# Patient Record
Sex: FEMALE
[De-identification: names, ages, dates, MRNs, and addresses within clinical notes are randomized per-mention and may not be internally consistent; named-entity substitution may affect disease eponyms.]

---

## 2017-01-18 PROCEDURE — 88305 TISSUE EXAM BY PATHOLOGIST: CPT | Performed by: OBSTETRICS & GYNECOLOGY

## 2017-08-07 ENCOUNTER — CHARTING TRANS (OUTPATIENT)
Dept: OTHER | Age: 62
End: 2017-08-07

## 2017-08-12 PROCEDURE — 81003 URINALYSIS AUTO W/O SCOPE: CPT | Performed by: FAMILY MEDICINE

## 2018-03-01 PROCEDURE — 87624 HPV HI-RISK TYP POOLED RSLT: CPT | Performed by: OBSTETRICS & GYNECOLOGY

## 2018-03-01 PROCEDURE — 88175 CYTOPATH C/V AUTO FLUID REDO: CPT | Performed by: OBSTETRICS & GYNECOLOGY

## 2018-07-03 PROBLEM — S76.312A LEFT HAMSTRING MUSCLE STRAIN: Status: ACTIVE | Noted: 2018-07-03

## 2018-11-17 PROCEDURE — 87389 HIV-1 AG W/HIV-1&-2 AB AG IA: CPT | Performed by: OBSTETRICS & GYNECOLOGY

## 2018-11-17 PROCEDURE — 86780 TREPONEMA PALLIDUM: CPT | Performed by: OBSTETRICS & GYNECOLOGY

## 2018-11-17 PROCEDURE — 81003 URINALYSIS AUTO W/O SCOPE: CPT | Performed by: FAMILY MEDICINE

## 2018-11-17 PROCEDURE — 86803 HEPATITIS C AB TEST: CPT | Performed by: OBSTETRICS & GYNECOLOGY

## 2019-02-19 PROCEDURE — 88305 TISSUE EXAM BY PATHOLOGIST: CPT | Performed by: RADIOLOGY

## 2019-12-23 PROBLEM — S76.312A LEFT HAMSTRING MUSCLE STRAIN: Status: RESOLVED | Noted: 2018-07-03 | Resolved: 2019-12-23

## 2024-01-08 ENCOUNTER — HOSPITAL ENCOUNTER (OUTPATIENT)
Age: 69
Discharge: HOME | End: 2024-01-08
Payer: MEDICARE

## 2024-01-08 VITALS
SYSTOLIC BLOOD PRESSURE: 128 MMHG | OXYGEN SATURATION: 96 % | BODY MASS INDEX: 35.44 KG/M2 | DIASTOLIC BLOOD PRESSURE: 89 MMHG | RESPIRATION RATE: 18 BRPM | HEART RATE: 67 BPM | WEIGHT: 200 LBS | HEIGHT: 63 IN | TEMPERATURE: 98 F

## 2024-01-08 DIAGNOSIS — H61.23 BILATERAL IMPACTED CERUMEN: Primary | ICD-10-CM

## 2024-01-08 PROCEDURE — 99203 OFFICE O/P NEW LOW 30 MIN: CPT | Performed by: NURSE PRACTITIONER

## 2024-01-08 NOTE — ED PROVIDER NOTES
Patient Seen in: Immediate Care [facility]    History     Chief Complaint   Patient presents with    Ear Pain     Stated Complaint: ear pain    HPI    Patient here today with  with c/o feeling like the right  ear is clogged, Pt has no  fever, no congestion or  cough.  Has a history of cerumen impaction.  She reports that she is going out of town on Saturday and wants to make sure that she does not have any issues.  No difficulty breathing. There are not alleviating or mitigating factors.  Patient is tolerating p.o. fluids without difficulty.      Past Medical History:   Diagnosis Date    Abnormal Pap smear of cervix 20 yrs ago    Allergic rhinitis     ASCUS of cervix with negative high risk HPV 2014,2016    SRI I (cervical intraepithelial neoplasia I) 2017    Coronary atherosclerosis     Esophageal reflux     Glaucoma     Hyperlipidemia     diet controlled    HYPERTENSION        Past Surgical History:   Procedure Laterality Date    BREAST BIOPSY Left     COLONOSCOPY  '    repeat     COLPOSCOPY,BX CERVIX/ENDOCERV CURR  15-20 yrs ago    COLPOSCOPY,BX CERVIX/ENDOCERV CURR  2017    SRI 1    EYE MUSCLE SURG PROC UNLISTED      lazy eye    KNEE REPLACEMENT SURGERY Left     2015    KNEE REPLACEMENT SURGERY      Right cemented TKR @[facility] 6/3/16.     NEEDLE BIOPSY LEFT  2019            Family History   Problem Relation Age of Onset    Kidney Disease Father     Gastro-Intestinal Disorder Mother     Hypertension Mother     Lipids Mother     Heart Disease Mother        Social History     Socioeconomic History    Marital status:    Tobacco Use    Smoking status: Light Smoker     Packs/day: 0.50     Years: 20.00     Additional pack years: 0.00     Total pack years: 10.00     Types: Cigarettes     Last attempt to quit: 1990     Years since quittin.0    Smokeless tobacco: Never   Vaping Use    Vaping Use: Never used   Substance and Sexual Activity    Alcohol use: Yes     Comment: 1-2  drinks daily    Drug use: No    Sexual activity: Yes     Partners: Male       Review of Systems    Positive for stated complaint: ear pain  Other systems are as noted in HPI.  Constitutional and vital signs reviewed.      All other systems reviewed and negative except as noted above.    PSFH elements reviewed from today and agreed except as otherwise stated in HPI.    Physical Exam     ED Triage Vitals [01/08/24 1134]   /89   Pulse 67   Resp 18   Temp 97.9 °F (36.6 °C)   Temp src Temporal   SpO2 96 %   O2 Device None (Room air)       Current:/89   Pulse 67   Temp 97.9 °F (36.6 °C) (Temporal)   Resp 18   Ht 160 cm (5' 3\")   Wt 90.7 kg   LMP 01/01/2007 (Exact Date)   SpO2 96%   BMI 35.43 kg/m²       GENERAL: well developed, well nourished, well hydrated, no distress  EARS: Right TM is nonvisualized due to a cerumen impaction.  Left TM is partially visualized due to cerumen.  NOSE: nasal turbinates boggy  THROAT: There is no posterior oropharynx injection, edema, tonsillar asymmetry.  No uvular edema or deviation.  Patient is tolerating their own secretions.  LUNGS: no resp distress, increased upper airway sounds, clear at the bases  SKIN: good skin turgor, no obvious rashes  NECK: supple, no adenopathy, no thyromegaly  CARDIO: RRR without murmur  EXTREMITIES: no cyanosis, clubbing or edema  GI: soft, non-tender, normal bowel sounds  HEAD: normocephalic, atraumatic  EYES: sclera non icteric bilateral, conjunctiva clear      ED Course   Labs Reviewed - No data to display      I have personally  reviewed available prior medical records for any recent pertinent discharge summaries/testing. Patient/family updated on results and plan, a verbalized understanding and agreement with the plan.  I explained to the patient that emergent conditions may arise and to go to the ER for new, worsening or any persistent conditions. I've explained the importance of taking all medicatons as prescribed, follow up, and  return precuations,  All questions answered.    MDM     Patient presents with earache. History and physical exam as above.  No fever, nontoxic appearing.  Does not meet SIRS criteria.  Oxygen saturation within normal limits for this patient.  No sore throat or difficulty swallowing, no trismus, unilateral tonsillar deviation or uvula swelling.  Presentation consistent with cerumen impaction.  There does not appear to be an otitis media or otitis externa discussed option of expectant management..  Recommend follow-up with PCP.  Counseled on supportive care including Tylenol.  Return to ED precautions discussed with the family.         Disposition and Plan     Clinical Impression:  1. Bilateral impacted cerumen        Disposition:  Discharge    Follow-up:  Vincent Almeida DO  [address] [place] [address] [phone]            Medications Prescribed:  Current Discharge Medication List

## 2024-01-08 NOTE — DISCHARGE INSTRUCTIONS
You had your ears irrigated today.  At home you can use Debrox to help with earwax removal    Refrain from using Q-tips, this can push earwax back into the ears.    Follow-up with your primary care physician and return to the emergency department with any worsening symptoms or concerns

## 2024-01-10 ENCOUNTER — HOSPITAL ENCOUNTER (OUTPATIENT)
Age: 69
Discharge: HOME | End: 2024-01-10
Payer: MEDICARE

## 2024-01-10 VITALS
RESPIRATION RATE: 16 BRPM | OXYGEN SATURATION: 97 % | SYSTOLIC BLOOD PRESSURE: 132 MMHG | WEIGHT: 200 LBS | BODY MASS INDEX: 35.44 KG/M2 | HEIGHT: 63 IN | DIASTOLIC BLOOD PRESSURE: 86 MMHG | HEART RATE: 62 BPM | TEMPERATURE: 97 F

## 2024-01-10 DIAGNOSIS — H69.91 DYSFUNCTION OF RIGHT EUSTACHIAN TUBE: Primary | ICD-10-CM

## 2024-01-10 PROCEDURE — 99213 OFFICE O/P EST LOW 20 MIN: CPT | Performed by: NURSE PRACTITIONER

## 2024-01-10 RX ORDER — METHYLPREDNISOLONE 4 MG/1
TABLET ORAL
Qty: 1 EACH | Refills: 0 | Status: SHIPPED | OUTPATIENT
Start: 2024-01-10

## 2024-01-10 NOTE — ED PROVIDER NOTES
Patient Seen in: Immediate Care [facility]      History     Chief Complaint   Patient presents with    Ear Problem     I was there 2 days ago.  My ear still feels clogged. - Entered by patient     Stated Complaint: Ear Problem - I was there 2 days ago.  My ear still feels clogged.    Subjective:   68-year-old female presents to complaints of ongoing decreased hearing to the right ear.  Was seen here 2 days prior had ears irrigated and earwax removed.  Denies any pain.  No fever or chills.  No dizziness no nausea vomiting.  Alert oriented x 3.  No other symptoms or concerns.  The patient's medication list, past medical history and social history elements as listed in today's nurse's notes were reviewed and agreed (except as otherwise stated in the HPI).  The patient's family history reviewed and determined to be noncontributory to the presenting problem            Objective:   Past Medical History:   Diagnosis Date    Abnormal Pap smear of cervix 20 yrs ago    Allergic rhinitis     ASCUS of cervix with negative high risk HPV 8/2014,12/2016    SRI I (cervical intraepithelial neoplasia I) 1/2017    Coronary atherosclerosis     Esophageal reflux     Glaucoma     Hyperlipidemia     diet controlled    HYPERTENSION               Past Surgical History:   Procedure Laterality Date    BREAST BIOPSY Left 2019    COLONOSCOPY  '13    repeat '20    COLPOSCOPY,BX CERVIX/ENDOCERV CURR  15-20 yrs ago    COLPOSCOPY,BX CERVIX/ENDOCERV CURR  1/2017    SRI 1    EYE MUSCLE SURG PROC UNLISTED  1979    lazy eye    KNEE REPLACEMENT SURGERY Left     7/2015    KNEE REPLACEMENT SURGERY      Right cemented TKR @[facility] 6/3/16.     NEEDLE BIOPSY LEFT  02/2019                Social History     Socioeconomic History    Marital status:    Tobacco Use    Smoking status: Light Smoker     Packs/day: 0.50     Years: 20.00     Additional pack years: 0.00     Total pack years: 10.00     Types: Cigarettes     Last attempt to quit: 1/1/1990      Years since quittin.0    Smokeless tobacco: Never   Vaping Use    Vaping Use: Never used   Substance and Sexual Activity    Alcohol use: Yes     Comment: 1-2 drinks daily    Drug use: No    Sexual activity: Yes     Partners: Male              Review of Systems    Positive for stated complaint: Ear Problem - I was there 2 days ago.  My ear still feels clogged.  Other systems are as noted in HPI.  Constitutional and vital signs reviewed.      All other systems reviewed and negative except as noted above.    Physical Exam     ED Triage Vitals [01/10/24 1215]   /86   Pulse 62   Resp 16   Temp 97.4 °F (36.3 °C)   Temp src Temporal   SpO2 97 %   O2 Device None (Room air)       Current:/86   Pulse 62   Temp 97.4 °F (36.3 °C) (Temporal)   Resp 16   Ht 160 cm (5' 3\")   Wt 90.7 kg   LMP 2007 (Exact Date)   SpO2 97%   BMI 35.43 kg/m²         Physical Exam  Vitals and nursing note reviewed.   Constitutional:       Appearance: She is well-developed.   HENT:      Head: Normocephalic.      Right Ear: Ear canal normal. A middle ear effusion is present.      Left Ear: Tympanic membrane and ear canal normal.      Nose: Mucosal edema present.      Mouth/Throat:      Pharynx: Uvula midline. Posterior oropharyngeal erythema present.   Eyes:      Conjunctiva/sclera: Conjunctivae normal.      Pupils: Pupils are equal, round, and reactive to light.   Cardiovascular:      Rate and Rhythm: Normal rate and regular rhythm.   Pulmonary:      Effort: Pulmonary effort is normal.      Breath sounds: Normal breath sounds.   Musculoskeletal:      Cervical back: Normal range of motion and neck supple.   Lymphadenopathy:      Cervical: No cervical adenopathy.   Skin:     General: Skin is warm and dry.   Neurological:      Mental Status: She is alert and oriented to person, place, and time.               ED Course   Labs Reviewed - No data to display                   MDM     Please note that this report has been produced  using speech recognition software and may contain errors related to that system including, but not limited to, errors in grammar, punctuation, and spelling, as well as words and phrases that possibly may have been recognized inappropriately.  If there are any questions or concerns, contact the dictating provider for clarification.        Note to patient: The 21st Century Cures Act makes medical notes like these available to patients in the interest of transparency. However, this is a medical document intended as peer to peer communication. It is written in medical language and may contain abbreviations or verbiage that are unfamiliar. It may appear blunt or direct. Medical documents are intended to carry relevant information, facts as evident, and the clinical opinion of the practitioner.                                   Medical Decision Making  Differential diagnosis includes but is not limited to: Otitis media, otitis externa, strep throat, eustachian tube dysfunction, mastoiditis, TMJ      Presented today with ongoing decreased hearing to the right ear.  Had earwax removed 2 days prior with some relief.  Still continues to have decreased hearing to the right ear.  On exam possible fluid behind the right TM but no signs of infection.  Will give prescription for Medrol Dosepak to take as directed.  Encouraged to continue take over-the-counter antihistamine.  To follow-up with primary care physician or ENT in 1 week if symptoms do not improve.  Patient verbalized understanding and agreed to plan of care.    Risk  OTC drugs.  Prescription drug management.  Risk Details: Medrol Dosepak        Disposition and Plan     Clinical Impression:  1. Dysfunction of right eustachian tube         Disposition:  Discharge  1/10/2024 12:33 pm    Follow-up:  Vincent Almeida DO  [address] [place] [address]  [phone]    In 1 week  As needed          Medications Prescribed:  Current Discharge Medication List        START  taking these medications    Details   methylPREDNISolone (MEDROL) 4 MG Oral Tablet Therapy Pack Dosepack: take as directed  Qty: 1 each, Refills: 0

## 2024-02-04 ENCOUNTER — HOSPITAL ENCOUNTER (OUTPATIENT)
Age: 69
Discharge: HOME | End: 2024-02-04
Payer: MEDICARE

## 2024-02-04 ENCOUNTER — APPOINTMENT (OUTPATIENT)
Dept: ULTRASOUND IMAGING | Facility: HOSPITAL | Age: 69
End: 2024-02-04
Attending: NURSE PRACTITIONER
Payer: MEDICARE

## 2024-02-04 VITALS
OXYGEN SATURATION: 95 % | DIASTOLIC BLOOD PRESSURE: 93 MMHG | TEMPERATURE: 98 F | RESPIRATION RATE: 22 BRPM | HEART RATE: 82 BPM | SYSTOLIC BLOOD PRESSURE: 148 MMHG

## 2024-02-04 DIAGNOSIS — M79.605 LEFT LEG PAIN: Primary | ICD-10-CM

## 2024-02-04 PROCEDURE — 93971 EXTREMITY STUDY: CPT | Performed by: NURSE PRACTITIONER

## 2024-02-04 PROCEDURE — 99213 OFFICE O/P EST LOW 20 MIN: CPT | Performed by: NURSE PRACTITIONER

## 2024-02-04 NOTE — DISCHARGE INSTRUCTIONS
Your ultrasound did not show any evidence of a blood clot in your leg.  This is likely a muscle strain in your leg or possibly an inflammation of the tendon or ligament.  Rest left leg  Cold compacts to injured area 3-4 times per day for the next few days.  Cool compress in place approximately 10 to 15 minutes at a time.  Always place a cloth barrier between the cold pack and your skin.  Elevate extremity often-above the level of the heart is best   You may take your own Mobic as previously prescribed if needed.  You may take over-the-counter acetaminophen as directed on packaging if needed.  Follow-up with your primary care provider.  An orthopedic referral is also provided if needed.  Seek immediate medical attention for worsening symptoms.

## 2024-02-04 NOTE — ED INITIAL ASSESSMENT (HPI)
Pt has had pain to her left lateral shin area that has not improved  it has been there for 10 days, and she was able to ambulate in [place] but has traveled by plane 4 flights in the past 2 weeks.  No sob

## 2024-02-04 NOTE — ED PROVIDER NOTES
Patient Seen in: Immediate Care [facility]      History     Chief Complaint   Patient presents with    Leg or Foot Injury     Entered by patient     Stated Complaint: Leg or Foot Injury    Subjective:   HPI  Patient is a 68-year-old female with hypertension, hyperlipidemia, glaucoma, esophageal reflux, allergic rhinitis, coronary artery disease presents with left lower extremity pain and swelling over the past 9 days.  Frequent air flights over the past few weeks.  Most recently returned from [place] this past Friday.  She walked more than usual while in [facility].  She also flew to [place].  These air flights did not exceed 4 hours.  She has taken Mobic without alleviation of her leg pain.  She points to left lateral lower extremity when describing pain.  Pain is exacerbated with ambulation.  Denies paresthesias distally.  Denies shortness of breath.  Denies recent travel, immobilization, surgery, bone fractures, personal/ family hx of DVTs/ PEs, blood clotting disorders, use of estrogen hormonal medications .  Denies hemoptysis/ tobacco use.        Objective:   Past Medical History:   Diagnosis Date    Abnormal Pap smear of cervix 20 yrs ago    Allergic rhinitis     ASCUS of cervix with negative high risk HPV 8/2014,12/2016    SRI I (cervical intraepithelial neoplasia I) 1/2017    Coronary atherosclerosis     Esophageal reflux     Glaucoma     Hyperlipidemia     diet controlled    HYPERTENSION               Past Surgical History:   Procedure Laterality Date    BREAST BIOPSY Left 2019    COLONOSCOPY  '13    repeat '20    COLPOSCOPY,BX CERVIX/ENDOCERV CURR  15-20 yrs ago    COLPOSCOPY,BX CERVIX/ENDOCERV CURR  1/2017    SRI 1    EYE MUSCLE SURG PROC UNLISTED  1979    lazy eye    KNEE REPLACEMENT SURGERY Left     7/2015    KNEE REPLACEMENT SURGERY      Right cemented TKR @[facility] 6/3/16.     NEEDLE BIOPSY LEFT  02/2019                Social History     Socioeconomic History    Marital status:    Tobacco Use     Smoking status: Light Smoker     Packs/day: 0.50     Years: 20.00     Additional pack years: 0.00     Total pack years: 10.00     Types: Cigarettes     Last attempt to quit: 1990     Years since quittin.1    Smokeless tobacco: Never   Vaping Use    Vaping Use: Never used   Substance and Sexual Activity    Alcohol use: Yes     Comment: 1-2 drinks daily    Drug use: No    Sexual activity: Yes     Partners: Male              Review of Systems    Positive for stated complaint: Leg or Foot Injury  Other systems are as noted in HPI.  Constitutional and vital signs reviewed.      All other systems reviewed and negative except as noted above.    Physical Exam     ED Triage Vitals [24 1122]   BP (!) 148/93   Pulse 82   Resp 22   Temp 98 °F (36.7 °C)   Temp src Temporal   SpO2 95 %   O2 Device None (Room air)       Current:BP (!) 148/93   Pulse 82   Temp 98 °F (36.7 °C) (Temporal)   Resp 22   LMP 2007 (Exact Date)   SpO2 95%         Physical Exam  Vitals and nursing note reviewed.   Constitutional:       General: She is not in acute distress.     Appearance: Normal appearance. She is not ill-appearing, toxic-appearing or diaphoretic.   HENT:      Mouth/Throat:      Mouth: Mucous membranes are moist.      Pharynx: Oropharynx is clear. No oropharyngeal exudate or posterior oropharyngeal erythema.   Eyes:      Conjunctiva/sclera: Conjunctivae normal.   Cardiovascular:      Rate and Rhythm: Normal rate and regular rhythm.      Pulses: Normal pulses.      Heart sounds: Normal heart sounds.   Pulmonary:      Effort: Pulmonary effort is normal.      Breath sounds: Normal breath sounds.   Abdominal:      General: Bowel sounds are normal. There is no distension.      Palpations: Abdomen is soft.      Tenderness: There is no abdominal tenderness.   Musculoskeletal:         General: Swelling present.      Comments: Left lower extremity with mild left calf swelling without tenderness to palpation of the calf.   Left lateral lower leg with mild tenderness over mid to proximal fibula.  CMS intact distally.   Lymphadenopathy:      Cervical: No cervical adenopathy.   Skin:     General: Skin is warm and dry.      Capillary Refill: Capillary refill takes less than 2 seconds.   Neurological:      Mental Status: She is alert and oriented to person, place, and time.   Psychiatric:         Mood and Affect: Mood normal.         Behavior: Behavior normal.                 ED Course   Labs Reviewed - No data to display            US VENOUS DOPPLER LEG LEFT - DIAG IMG (CPT=93971)    Result Date: 2/4/2024  PROCEDURE:  US VENOUS DOPPLER LEG LEFT - DIAG IMG (CPT=93971)  COMPARISON:  None.  INDICATIONS:  Leg or Foot Injury, pain  TECHNIQUE:  Real time, grey scale, and duplex ultrasound was used to evaluate the lower extremity venous system. B-mode two-dimensional images of the vascular structures, Doppler spectral analysis, and color flow.  Doppler imaging were performed.  The following veins were imaged:  Common, deep, and superficial femoral, popliteal, sapheno-femoral junction, posterior tibial veins, and the contralateral common femoral vein.  PATIENT STATED HISTORY: (As transcribed by Technologist)     FINDINGS:  EXTREMITY EXAMINED:  Left lower SAPHENOFEMORAL JUNCTION:  No reflux. THROMBI:  None visible. COMPRESSION:  Normal compressibility, phasicity, and augmentation. OTHER:  Negative.            CONCLUSION:  No evidence of left lower extremity DVT.   LOCATION:  [place]    Dictated by (CST): Ollie Soliz MD on 2/04/2024 at 1:24 PM     Finalized by (CST): Ollie Soliz MD on 2/04/2024 at 1:27 PM               MDM     68-year-old female presents with 9-day history of left lower extremity pain and swelling.  Frequent air flights recently.  Denies history of blood clots or blood clotting disorders.  Differential; DVT, musculoskeletal etiology.  Venous Doppler performed in the outpatient department of [facility].  There is no evidence of a  left lower extremity DVT.  Patient was contacted.  Likely this is musculoskeletal in nature.  Conservative treatment.  Follow-up with PCP or seek immediate medical attention if symptoms worsen.  Patient is agreeable with this plan.                                     Medical Decision Making      Disposition and Plan     Clinical Impression:  1. Left leg pain         Disposition:  Discharge  2/4/2024  1:46 pm    Follow-up:  Vincent Almeida DO  [address]   [facility] [address] [phone]    Schedule an appointment as soon as possible for a visit       Flex Werner PA  [address] [place] [address] [phone]      Orthopedic referral if needed          Medications Prescribed:  Current Discharge Medication List

## 2024-07-16 RX ORDER — METHOCARBAMOL 750 MG/1
750 TABLET, FILM COATED ORAL DAILY
COMMUNITY

## 2024-07-16 RX ORDER — FLUTICASONE PROPIONATE 50 MCG
2 SPRAY, SUSPENSION (ML) NASAL DAILY
COMMUNITY
Start: 2024-01-23

## 2024-07-19 ENCOUNTER — ANESTHESIA (OUTPATIENT)
Dept: ENDOSCOPY | Facility: HOSPITAL | Age: 69
End: 2024-07-19
Payer: MEDICARE

## 2024-07-19 ENCOUNTER — HOSPITAL ENCOUNTER (OUTPATIENT)
Facility: HOSPITAL | Age: 69
Setting detail: HOSPITAL OUTPATIENT SURGERY
Discharge: HOME | End: 2024-07-19
Attending: INTERNAL MEDICINE | Admitting: INTERNAL MEDICINE
Payer: MEDICARE

## 2024-07-19 ENCOUNTER — ANESTHESIA EVENT (OUTPATIENT)
Dept: ENDOSCOPY | Facility: HOSPITAL | Age: 69
End: 2024-07-19
Payer: MEDICARE

## 2024-07-19 VITALS
SYSTOLIC BLOOD PRESSURE: 103 MMHG | OXYGEN SATURATION: 93 % | TEMPERATURE: 98 F | RESPIRATION RATE: 17 BRPM | DIASTOLIC BLOOD PRESSURE: 64 MMHG | BODY MASS INDEX: 35.44 KG/M2 | HEART RATE: 69 BPM | WEIGHT: 200 LBS | HEIGHT: 63 IN

## 2024-07-19 PROCEDURE — 0DBP8ZX EXCISION OF RECTUM, VIA NATURAL OR ARTIFICIAL OPENING ENDOSCOPIC, DIAGNOSTIC: ICD-10-PCS | Performed by: INTERNAL MEDICINE

## 2024-07-19 PROCEDURE — 88305 TISSUE EXAM BY PATHOLOGIST: CPT | Performed by: INTERNAL MEDICINE

## 2024-07-19 RX ORDER — NALOXONE HYDROCHLORIDE 0.4 MG/ML
0.08 INJECTION, SOLUTION INTRAMUSCULAR; INTRAVENOUS; SUBCUTANEOUS ONCE AS NEEDED
Status: DISCONTINUED | OUTPATIENT
Start: 2024-07-19 | End: 2024-07-19

## 2024-07-19 RX ORDER — SODIUM CHLORIDE, SODIUM LACTATE, POTASSIUM CHLORIDE, CALCIUM CHLORIDE 600; 310; 30; 20 MG/100ML; MG/100ML; MG/100ML; MG/100ML
INJECTION, SOLUTION INTRAVENOUS CONTINUOUS
Status: DISCONTINUED | OUTPATIENT
Start: 2024-07-19 | End: 2024-07-19

## 2024-07-19 RX ORDER — LIDOCAINE HYDROCHLORIDE 10 MG/ML
INJECTION, SOLUTION EPIDURAL; INFILTRATION; INTRACAUDAL; PERINEURAL AS NEEDED
Status: DISCONTINUED | OUTPATIENT
Start: 2024-07-19 | End: 2024-07-19 | Stop reason: SURG

## 2024-07-19 RX ORDER — ONDANSETRON 2 MG/ML
4 INJECTION INTRAMUSCULAR; INTRAVENOUS ONCE AS NEEDED
Status: DISCONTINUED | OUTPATIENT
Start: 2024-07-19 | End: 2024-07-19

## 2024-07-19 RX ADMIN — SODIUM CHLORIDE, SODIUM LACTATE, POTASSIUM CHLORIDE, CALCIUM CHLORIDE: 600; 310; 30; 20 INJECTION, SOLUTION INTRAVENOUS at 11:01:00

## 2024-07-19 RX ADMIN — LIDOCAINE HYDROCHLORIDE 25 MG: 10 INJECTION, SOLUTION EPIDURAL; INFILTRATION; INTRACAUDAL; PERINEURAL at 11:01:00

## 2024-07-19 NOTE — ANESTHESIA POSTPROCEDURE EVALUATION
[facility]    Adina Gandhi Patient Status:  Hospital Outpatient Surgery   Age/Gender 68 year old female MRN [id number]   Location [facility] ENDOSCOPY PAIN [facility]ENTER Attending No att. providers found   Hosp Day # 0 PCP Vincent Almeida DO       Anesthesia Post-op Note    COLONOSCOPY with cold snare polypectomy    Procedure Summary       Date: 07/19/24 Room / Location:  ENDOSCOPY 03 / [facility] ENDOSCOPY    Anesthesia Start: 1100 Anesthesia Stop: 1121    Procedure: COLONOSCOPY with cold snare polypectomy Diagnosis: (hemorrhoids, diverticulosis, colon polyps)    Surgeons: Herve Patterson MD Anesthesiologist: Constantino Spring MD    Anesthesia Type: MAC ASA Status: 3            Anesthesia Type: MAC    Vitals Value Taken Time   /68 07/19/24 1136   Temp 98 07/19/24 1214   Pulse 69 07/19/24 1136   Resp 18 07/19/24 1214   SpO2 98 % 07/19/24 1136   Vitals shown include unfiled device data.    Patient Location: Endoscopy    Anesthesia Type: MAC    Airway Patency: patent    Postop Pain Control: adequate    Mental Status: preanesthetic baseline    Nausea/Vomiting: none    Cardiopulmonary/Hydration status: stable euvolemic    Complications: no apparent anesthesia related complications    Postop vital signs: stable    Dental Exam: Unchanged from Preop    Patient to be discharged home when criteria met.

## 2024-07-19 NOTE — H&P
History and Physical for Endoscopic Procedure      Adina Gandhi Patient Status:  Hospital Outpatient Surgery    10/18/1955 MRN [id number]   Location [facility] ENDOSCOPY PAIN CENTER Attending Herve Patterson MD   Hosp Day # 0 PCP Vincent Almeida DO     Date of Consult:  24    Reason for Consultation:  Colon cancer screening    History of Present Illness:  Adina Gandhi is a a(n) 68 year old female.     History:  Past Medical History:    Abnormal Pap smear of cervix    Allergic rhinitis    ASCUS of cervix with negative high risk HPV    Back problem    SRI I (cervical intraepithelial neoplasia I)    Coronary atherosclerosis    Esophageal reflux    Glaucoma    High blood pressure    High cholesterol    Hx of motion sickness    Hyperlipidemia    diet controlled    HYPERTENSION    Visual impairment    reading glasses     Past Surgical History:   Procedure Laterality Date    Breast biopsy Left     Colonoscopy  '    repeat     Colposcopy,bx cervix/endocerv curr  15-20 yrs ago    Colposcopy,bx cervix/endocerv curr  2017    SRI 1    Eye muscle surg proc unlisted      lazy eye    Knee replacement surgery Left     2015    Knee replacement surgery      Right cemented TKR @[facility] 6/3/16.     Needle biopsy left  2019     Family History   Problem Relation Age of Onset    Kidney Disease Father     Gastro-Intestinal Disorder Mother     Hypertension Mother     Lipids Mother     Heart Disease Mother       reports that she has been smoking cigarettes. She started smoking about 54 years ago. She has a 10 pack-year smoking history. She has never used smokeless tobacco. She reports current alcohol use. She reports current drug use. Drug: Cannabis.    Allergies:  Allergies   Allergen Reactions    Dander      Cats-eye tearing, swelling of eyes    Dust      Nasal symptoms    Mold      Nasal symptoms       Medications:    Current Facility-Administered Medications:     lactated ringers infusion, , Intravenous,  Continuous    Review of Systems:  Gastrointestinal: negative other than specified in the HPI  General: negative other than specified in the HPI  Neurological: negative other than specified in the HPI  Cardiovascular: negative other than specified in the HPI  Respiratory: negative other than specified in the HPI    Physical Exam:  No acute distress  RRR  CTA B/L  SOFT +BS    Assessment/Plan:  Patient Active Problem List   Diagnosis    Hypertension    Anxiety    Hyperlipidemia    Mucous cyst of finger    Glaucoma    GERD (gastroesophageal reflux disease)       Colonoscopy today.    Herve Patterson MD  7/19/2024  10:52 AM

## 2024-07-19 NOTE — ANESTHESIA PREPROCEDURE EVALUATION
PRE-OP EVALUATION    Patient Name: Adina Gandhi    Admit Diagnosis: COLON CANCER SCREENING    Pre-op Diagnosis: COLON CANCER SCREENING    COLONOSCOPY    Anesthesia Procedure: COLONOSCOPY    Surgeons and Role:     * Herve Patterson MD - Primary    Pre-op vitals reviewed.  Temp: 97.6 °F (36.4 °C)  Pulse: 71  Resp: 17  BP: 127/89  SpO2: 95 %  Body mass index is 35.43 kg/m².    Current medications reviewed.  Hospital Medications:   lactated ringers infusion   Intravenous Continuous       Outpatient Medications:     Medications Prior to Admission   Medication Sig Dispense Refill Last Dose    DICLOFENAC OR Take 50 mg by mouth in the morning and 50 mg before bedtime.   7/18/2024    methocarbamol 750 MG Oral Tab Take 1 tablet (750 mg total) by mouth daily.   Past Week    fluticasone propionate 50 MCG/ACT Nasal Suspension 2 sprays by Nasal route daily.   7/18/2024    diazePAM 5 MG Oral Tab TAKE 1 TABLET BY MOUTH AT BEDTIME AS NEEDED FOR ANXIETY 20 tablet 0 Past Week    LISINOPRIL 10 MG Oral Tab TAKE 1 TABLET(10 MG) BY MOUTH DAILY 90 tablet 3 7/19/2024    Mometasone Furoate 0.1 % External Cream Apply bid 45 g 1     Metoprolol-hydroCHLOROthiazide 100-25 MG Oral Tab Take 1 tablet by mouth 2 (two) times daily. 180 tablet 3 7/19/2024    HYDROcodone-acetaminophen (NORCO) 5-325 MG Oral Tab Take 1 tablet by mouth every 6 (six) hours as needed for Pain. 28 tablet 0 Past Week    Pumpkin Seed-Soy Germ (AZO BLADDER CONTROL/GO-LESS OR) Take by mouth.   Past Week    omeprazole (PRILOSEC) 20 MG Oral Capsule Delayed Release Take 1 capsule (20 mg total) by mouth every other day.   7/18/2024    Multiple Vitamins-Minerals (MULTIVITAMIN OR) Take 1 tablet by mouth daily.     Past Week    ALLEGRA 180 MG OR TABS 1 TABLET DAILY 90 Tab 3 7/19/2024    MELOXICAM 15 MG Oral Tab TAKE 1 TABLET(15 MG) BY MOUTH EVERY DAY (Patient not taking: Reported on 7/16/2024) 90 tablet 1 Not Taking       Allergies: Dander, Dust, and Mold      Anesthesia  Evaluation    Patient summary reviewed.    Anesthetic Complications           GI/Hepatic/Renal      (+) GERD                           Cardiovascular                  (+) hypertension   (+) hyperlipidemia  (+) CAD                                Endo/Other                                  Pulmonary                           Neuro/Psych                                      Past Surgical History:   Procedure Laterality Date    Breast biopsy Left     Colonoscopy      repeat     Colposcopy,bx cervix/endocerv curr  15-20 yrs ago    Colposcopy,bx cervix/endocerv curr  2017    [facility] 1    Eye muscle surg proc unlisted      lazy eye    Knee replacement surgery Left     2015    Knee replacement surgery      Right cemented TKR @[facility] 6/3/16.     Needle biopsy left  2019     Social History     Socioeconomic History    Marital status:    Tobacco Use    Smoking status: Light Smoker     Current packs/day: 0.00     Average packs/day: 0.5 packs/day for 20.0 years (10.0 ttl pk-yrs)     Types: Cigarettes     Start date: 1970     Last attempt to quit: 1990     Years since quittin.5    Smokeless tobacco: Never   Vaping Use    Vaping status: Never Used   Substance and Sexual Activity    Alcohol use: Yes     Comment: 1-2 drinks daily    Drug use: Yes     Types: Cannabis     Comment: 1 per month    Sexual activity: Yes     Partners: Male     History   Drug Use    Types: Cannabis     Comment: 1 per month     Available pre-op labs reviewed.               Airway      Mallampati: III  Mouth opening: 3 FB  TM distance: 4 - 6 cm  Neck ROM: full Cardiovascular    Cardiovascular exam normal.         Dental    Dentition appears grossly intact         Pulmonary    Pulmonary exam normal.                 Other findings              ASA: 3   Plan: MAC  NPO status verified and patient meets guidelines.    Post-procedure pain management plan discussed with surgeon and patient.      Plan/risks discussed with:  patient                Present on Admission:  **None**

## 2024-07-19 NOTE — DISCHARGE INSTRUCTIONS
Home Care Instructions for Colonoscopy with Sedation    Diet:  - Resume your regular diet   - Start with light meals to minimize bloating.  - Do not drink alcohol today.    Medication:  - If you have questions about resuming your normal medications, please contact your Primary Care Physician.    Activities:  - Take it easy today. Do not return to work today.  - Do not drive today.  - Do not operate any machinery today (including kitchen equipment).    Colonoscopy:  - You may notice some rectal \"spotting\" (a little blood on the toilet tissue) for a day or two after the exam. This is normal.  - If you experience any rectal bleeding (not spotting), persistent tenderness or sharp severe abdominal pains, oral temperature over 100 degrees Fahrenheit, light-headedness or dizziness, or any other problems, contact your doctor.    **If unable to reach your doctor, please go to the [facility] Emergency Room**    - Your referring physician will receive a full report of your examination.  - If you do not hear from your doctor's office within two weeks of your biopsy, please call them for your results.  FINDINGS:  1.  2 small polyps were found and removed.  2.  There were small pockets in the colon called diverticulosis.  These are considered normal to have.  So long as you have not had any complications with these pockets such as significant rectal bleeding or infections called diverticulitis, then there is nothing recommended that you need to do.      PLAN:  1.  Await the biopsy results.      If you have any questions, please call us at [phone].    Thank you,  Herve Patterson MD

## 2024-07-19 NOTE — OPERATIVE REPORT
Colonoscopy Operative Report    Adina Gandhi Patient Status:  Hospital Outpatient Surgery    10/18/1955 MRN [id number]   [facility] Attending Herve Patterson MD   Hosp Day #   0 PCP Vincent Almeida DO     Pre-Operative Diagnosis: COLON CANCER SCREENING    Post-Operative Diagnosis:  2 rectal polyps both 4mm sessile and removed with a cold snare.    Pan colon diverticulosis.    Hemorrhoids      Procedure Performed: COLONOSCOPY with snare polypectomy    Informed Consent: Informed consent for both the procedure and sedation were obtained from the patient. The potentially life-threatening complications of sedation, bleeding,  perforation, transfusion or repeat endoscopy  were reviewed along with the possible need for hospitalization, surgical management, transfusion or repeat endoscopy should one of these complications arise. The patient understands and is agreeable to proceed.  Sedation Type: MAC-Patient received sedation with monitored anesthesia provided by an anesthesiologist  Moderate Sedation Time: None.  Deep sedation provided by anesthesia.  Cecum Withdrawal Time:  7 min  Date of previous colonoscopy:     Procedure Description: The patient was placed in the left lateral decubitus position.  After careful digital rectal examination, the Adult colonoscope was inserted into the rectum and advanced to the level of the cecum under direct visualization. The cecum was identified by landmarks, including the appendiceal orifice and ileoceccal valve. Careful examination of the entire colon was performed during withdrawal of the endoscope. The scope was withdrawn to the rectum and retroflexion was performed.  The patient tolerated the procedure well with no immediate complications. The patient was transferred to the recovery area in stable condition.  Quality of Preparation: Adequate  Aronchick Bowel Prep Scale:  2 - good  Findings:   2  rectal polyps both 4mm sessile and removed with a cold snare.    Pan colon diverticulosis.    Hemorrhoids      Recommendations:    Await pathology results.  Further recommendations with regards to when the repeat the colonoscopy will be based on these results.    Discharge:  The patient was given an after visit summary detailing the procedure, findings, recommendations and follow up plans.     Herve Patterson MD  7/19/2024  10:53 AM

## 2024-11-19 ENCOUNTER — LAB REQUISITION (OUTPATIENT)
Dept: LAB | Facility: HOSPITAL | Age: 69
End: 2024-11-19
Payer: MEDICARE

## 2024-11-19 DIAGNOSIS — M67.449 GANGLION, UNSPECIFIED HAND: ICD-10-CM

## 2024-11-19 DIAGNOSIS — M19.042 PRIMARY OSTEOARTHRITIS, LEFT HAND: ICD-10-CM

## 2024-11-19 PROCEDURE — 88304 TISSUE EXAM BY PATHOLOGIST: CPT | Performed by: ORTHOPAEDIC SURGERY

## (undated) DEVICE — KIT CUSTOM ENDOPROCEDURE STERIS

## (undated) DEVICE — 1200CC GUARDIAN II: Brand: GUARDIAN

## (undated) DEVICE — LASSO POLYPECTOMY SNARE: Brand: LASSO

## (undated) DEVICE — KIT MFLD FOR SPEC COLL

## (undated) DEVICE — 10FT COMBINED O2 DELIVERY/CO2 MONITORING. FILTER WITH MICROSTREAM TYPE LUER: Brand: DUAL ADULT NASAL CANNULA

## (undated) DEVICE — KIT VLV 5 PC AIR H2O SUCT BX ENDOGATOR CONN

## (undated) DEVICE — 3M™ RED DOT™ MONITORING ELECTRODE WITH FOAM TAPE AND STICKY GEL, 50/BAG, 20/CASE, 72/PLT 2570: Brand: RED DOT™